# Patient Record
Sex: MALE | Race: BLACK OR AFRICAN AMERICAN | NOT HISPANIC OR LATINO | URBAN - METROPOLITAN AREA
[De-identification: names, ages, dates, MRNs, and addresses within clinical notes are randomized per-mention and may not be internally consistent; named-entity substitution may affect disease eponyms.]

---

## 2018-12-30 ENCOUNTER — EMERGENCY (EMERGENCY)
Facility: HOSPITAL | Age: 38
LOS: 1 days | Discharge: HOME | End: 2018-12-30
Admitting: EMERGENCY MEDICINE
Payer: MEDICAID

## 2018-12-30 VITALS
DIASTOLIC BLOOD PRESSURE: 64 MMHG | SYSTOLIC BLOOD PRESSURE: 121 MMHG | TEMPERATURE: 98 F | RESPIRATION RATE: 18 BRPM | HEART RATE: 67 BPM | OXYGEN SATURATION: 99 %

## 2018-12-30 PROCEDURE — 99282 EMERGENCY DEPT VISIT SF MDM: CPT

## 2018-12-30 NOTE — ED PROVIDER NOTE - SKIN, MLM
Skin normal color for race, warm, dry and intact. No evidence of rash. Several old scars noted to the back. Skin normal color for race, warm, dry and intact. No evidence of rash. Several old scars noted to the back. + small superficial abrasion to upper back

## 2018-12-30 NOTE — ED PROVIDER NOTE - MEDICAL DECISION MAKING DETAILS
abrasion to upper back, superficial, no signs of infection, advised keeping it clean/dry, bacitracin, f/u PMD

## 2018-12-30 NOTE — ED PROVIDER NOTE - OBJECTIVE STATEMENT
39 y/o Male with a PMHx of recent Pituitary tumor diagnosis presents to the ED stating he had something on his back and was it to be evaluated. However, upon my interview, he asked for a Metro[employer] to go back to [place]. 39 y/o Male with pmhx of pituitary tumor presents to the ED asking for a metrocard to get back to [place]. also c/o scratch to upper back - unsure how he sustained scratch

## 2018-12-30 NOTE — ED PROVIDER NOTE - NSFOLLOWUPINSTRUCTIONS_ED_ALL_ED_FT
Keep abrasion clean and dry  Apply neosporin over the counter twice a day  Follow up with your primary care provider this week.    RETURN TO THE EMERGENCY DEPARTMENT FOR ANY WORSENING OR NEW SYMPTOMS

## 2018-12-31 PROBLEM — Z00.00 ENCOUNTER FOR PREVENTIVE HEALTH EXAMINATION: Status: ACTIVE | Noted: 2018-12-31
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
## 2019-01-03 DIAGNOSIS — S20.419A ABRASION OF UNSPECIFIED BACK WALL OF THORAX, INITIAL ENCOUNTER: ICD-10-CM
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
## 2025-07-30 NOTE — ED ADULT TRIAGE NOTE - NS ED NOTE AC HIGH RISK COUNTRIES
I agree with the Resident's findings and plan, as documented in today's note.    Krystle Hood MD   No